# Patient Record
Sex: FEMALE | Race: BLACK OR AFRICAN AMERICAN | NOT HISPANIC OR LATINO | Employment: FULL TIME | URBAN - METROPOLITAN AREA
[De-identification: names, ages, dates, MRNs, and addresses within clinical notes are randomized per-mention and may not be internally consistent; named-entity substitution may affect disease eponyms.]

---

## 2017-06-02 ENCOUNTER — PATIENT MESSAGE (OUTPATIENT)
Dept: FAMILY MEDICINE | Facility: CLINIC | Age: 27
End: 2017-06-02

## 2017-06-21 ENCOUNTER — LAB VISIT (OUTPATIENT)
Dept: LAB | Facility: OTHER | Age: 27
End: 2017-06-21
Attending: NURSE PRACTITIONER
Payer: COMMERCIAL

## 2017-06-21 ENCOUNTER — OFFICE VISIT (OUTPATIENT)
Dept: OBSTETRICS AND GYNECOLOGY | Facility: CLINIC | Age: 27
End: 2017-06-21
Payer: COMMERCIAL

## 2017-06-21 VITALS
BODY MASS INDEX: 26.75 KG/M2 | SYSTOLIC BLOOD PRESSURE: 100 MMHG | DIASTOLIC BLOOD PRESSURE: 70 MMHG | WEIGHT: 151 LBS | HEIGHT: 63 IN

## 2017-06-21 DIAGNOSIS — Z30.011 ENCOUNTER FOR INITIAL PRESCRIPTION OF CONTRACEPTIVE PILLS: Primary | ICD-10-CM

## 2017-06-21 DIAGNOSIS — Z01.419 WELL WOMAN EXAM WITH ROUTINE GYNECOLOGICAL EXAM: ICD-10-CM

## 2017-06-21 DIAGNOSIS — Z11.3 SCREEN FOR STD (SEXUALLY TRANSMITTED DISEASE): ICD-10-CM

## 2017-06-21 DIAGNOSIS — Z01.419 PAP SMEAR, AS PART OF ROUTINE GYNECOLOGICAL EXAMINATION: ICD-10-CM

## 2017-06-21 LAB
B-HCG UR QL: NEGATIVE
CANDIDA RRNA VAG QL PROBE: NEGATIVE
CTP QC/QA: YES
G VAGINALIS RRNA GENITAL QL PROBE: POSITIVE
HAV IGM SERPL QL IA: NEGATIVE
HBV CORE IGM SERPL QL IA: NEGATIVE
HBV SURFACE AG SERPL QL IA: NEGATIVE
HCV AB SERPL QL IA: NEGATIVE
HIV 1+2 AB+HIV1 P24 AG SERPL QL IA: NEGATIVE
RPR SER QL: NORMAL
T VAGINALIS RRNA GENITAL QL PROBE: NEGATIVE

## 2017-06-21 PROCEDURE — 99385 PREV VISIT NEW AGE 18-39: CPT | Mod: S$GLB,,, | Performed by: NURSE PRACTITIONER

## 2017-06-21 PROCEDURE — 36415 COLL VENOUS BLD VENIPUNCTURE: CPT

## 2017-06-21 PROCEDURE — 86703 HIV-1/HIV-2 1 RESULT ANTBDY: CPT

## 2017-06-21 PROCEDURE — 87480 CANDIDA DNA DIR PROBE: CPT

## 2017-06-21 PROCEDURE — 99999 PR PBB SHADOW E&M-EST. PATIENT-LVL II: CPT | Mod: PBBFAC,,, | Performed by: NURSE PRACTITIONER

## 2017-06-21 PROCEDURE — 81025 URINE PREGNANCY TEST: CPT | Mod: QW,S$GLB,, | Performed by: NURSE PRACTITIONER

## 2017-06-21 PROCEDURE — 87591 N.GONORRHOEAE DNA AMP PROB: CPT

## 2017-06-21 PROCEDURE — 86592 SYPHILIS TEST NON-TREP QUAL: CPT

## 2017-06-21 PROCEDURE — 80074 ACUTE HEPATITIS PANEL: CPT

## 2017-06-21 PROCEDURE — 88175 CYTOPATH C/V AUTO FLUID REDO: CPT

## 2017-06-21 RX ORDER — DESOGESTREL AND ETHINYL ESTRADIOL 21-5 (28)
1 KIT ORAL DAILY
Qty: 30 TABLET | Refills: 11 | Status: SHIPPED | OUTPATIENT
Start: 2017-06-21 | End: 2018-06-17 | Stop reason: SDUPTHER

## 2017-06-21 NOTE — PROGRESS NOTES
CC: Annual  HPI: Pt is a 26 y.o.  female who presents for routine annual exam. She uses condoms for contraception. She does want STD screening. She has been on OCPs in the past and is interested in starting them again. Reports some weight gain while on Minastrin in the past but thinks it was d/t stress of school at the time. Pt has received the HPV vaccine in full. No family h/o breast cancer. Regular cycles- LMP 6/12/17. 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 No problems today.     Last pap-none on record (per pt report 2 years ago and WNL)    ROS:  GENERAL: Feeling well overall. Denies fever or chills.   SKIN: Denies rash or lesions.   HEAD: Denies head injury or headache.   NODES: Denies enlarged lymph nodes.   CHEST: Denies chest pain or shortness of breath.   CARDIOVASCULAR: Denies palpitations or left sided chest pain.   ABDOMEN: No abdominal pain, constipation, diarrhea, nausea, vomiting or rectal bleeding.   URINARY: No dysuria, hematuria, or burning on urination.  REPRODUCTIVE: See HPI.   BREASTS: Denies pain, lumps, or nipple discharge.   HEMATOLOGIC: No easy bruisability or excessive bleeding.   MUSCULOSKELETAL: Denies joint pain or swelling.   NEUROLOGIC: Denies syncope or weakness.   PSYCHIATRIC: Denies depression, anxiety or mood swings.    PE:   APPEARANCE: Well nourished, well developed, Black or  female in no acute distress.  NODES: no cervical, supraclavicular, or inguinal lymphadenopathy  BREASTS: Symmetrical, no skin changes or visible lesions. No palpable masses, nipple discharge or adenopathy bilaterally.  ABDOMEN: Soft. No tenderness or masses. No distention. No hernias palpated. No CVA tenderness.  VULVA: No lesions. Normal external female genitalia.  URETHRAL MEATUS: Normal size and location, no lesions, no prolapse.  URETHRA: No masses, tenderness, or prolapse.  VAGINA: Moist. No lesions or lacerations noted. No abnormal discharge present. No odor present.    CERVIX: No lesions or discharge. No cervical motion tenderness.   UTERUS: Normal size, regular shape, mobile, non-tender.  ADNEXA: No tenderness. No fullness or masses palpated in the adnexal regions.   ANUS PERINEUM: Normal.      Diagnosis:  1. Encounter for initial prescription of contraceptive pills    2. Well woman exam with routine gynecological exam    3. Pap smear, as part of routine gynecological examination    4. Screen for STD (sexually transmitted disease)        Plan:     Orders Placed This Encounter    C. trachomatis/N. gonorrhoeae by AMP DNA Cervix    Vaginosis Screen by DNA Probe    HIV-1 and HIV-2 antibodies    RPR    Hepatitis panel, acute    POCT urine pregnancy    Liquid-based pap smear, screening    desog-e.estradiol/e.estradiol (KARIVA) 0.15-0.02 mgx21 /0.01 mg x 5 per tablet     -upt negative  -pap updated  -full std panel  -rx ocps  -The use of the oral contraceptive has been fully discussed with the patient. This includes the proper method to initiate and continue the pills, the need for regular compliance to ensure adequate contraceptive effect, the physiology which make the pill effective, the instructions for what to do in event of a missed pill, and warnings about anticipated minor side effects such as breakthrough spotting, nausea, breast tenderness, weight changes, acne, headaches, etc.  She has been told of the more serious potential side effects such as MI, stroke, and deep vein thrombosis, all of which are very unlikely.  She has been asked to report any signs of such serious problems immediately.  She should back up the pill with a condom during any cycle in which antibiotics are prescribed, and during the first cycle as well. The need for additional protection, such as a condom, to prevent exposure to sexually transmitted diseases has also been discussed- the patient has been clearly reminded that OCP's cannot protect her against diseases such as HIV and others. She  understands and wishes to take the medication as prescribed.     Patient was counseled today on the new ACS guidelines for cervical cytology screening as well as the current recommendations for breast cancer screening. She was counseled to follow up with her PCP for other routine health maintenance. Counseling session lasted approximately 10 minutes, and all her questions were answered.    Follow-up with me in 1 year for routine exam; pap in 3 years.

## 2017-06-22 DIAGNOSIS — B96.89 BV (BACTERIAL VAGINOSIS): Primary | ICD-10-CM

## 2017-06-22 DIAGNOSIS — N76.0 BV (BACTERIAL VAGINOSIS): Primary | ICD-10-CM

## 2017-06-22 LAB
C TRACH DNA SPEC QL NAA+PROBE: NOT DETECTED
N GONORRHOEA DNA SPEC QL NAA+PROBE: NOT DETECTED

## 2017-06-22 RX ORDER — TINIDAZOLE 500 MG/1
2 TABLET ORAL DAILY
Qty: 8 TABLET | Refills: 0 | Status: SHIPPED | OUTPATIENT
Start: 2017-06-22 | End: 2017-06-24

## 2018-06-17 DIAGNOSIS — Z30.011 ENCOUNTER FOR INITIAL PRESCRIPTION OF CONTRACEPTIVE PILLS: ICD-10-CM

## 2018-06-18 ENCOUNTER — PATIENT MESSAGE (OUTPATIENT)
Dept: OBSTETRICS AND GYNECOLOGY | Facility: CLINIC | Age: 28
End: 2018-06-18

## 2018-06-18 RX ORDER — DESOGESTREL AND ETHINYL ESTRADIOL AND ETHINYL ESTRADIOL 21-5 (28)
KIT ORAL
Qty: 30 TABLET | Refills: 0 | Status: SHIPPED | OUTPATIENT
Start: 2018-06-18 | End: 2018-08-22 | Stop reason: SDUPTHER

## 2018-06-19 DIAGNOSIS — Z30.011 ENCOUNTER FOR INITIAL PRESCRIPTION OF CONTRACEPTIVE PILLS: ICD-10-CM

## 2018-06-19 RX ORDER — DESOGESTREL AND ETHINYL ESTRADIOL AND ETHINYL ESTRADIOL 21-5 (28)
KIT ORAL
Qty: 28 TABLET | Refills: 0 | OUTPATIENT
Start: 2018-06-19

## 2018-08-22 ENCOUNTER — OFFICE VISIT (OUTPATIENT)
Dept: OBSTETRICS AND GYNECOLOGY | Facility: CLINIC | Age: 28
End: 2018-08-22
Payer: COMMERCIAL

## 2018-08-22 VITALS
WEIGHT: 156.5 LBS | HEIGHT: 63 IN | SYSTOLIC BLOOD PRESSURE: 128 MMHG | BODY MASS INDEX: 27.73 KG/M2 | DIASTOLIC BLOOD PRESSURE: 70 MMHG

## 2018-08-22 DIAGNOSIS — Z30.41 ENCOUNTER FOR SURVEILLANCE OF CONTRACEPTIVE PILLS: ICD-10-CM

## 2018-08-22 DIAGNOSIS — Z01.419 WELL WOMAN EXAM WITH ROUTINE GYNECOLOGICAL EXAM: Primary | ICD-10-CM

## 2018-08-22 DIAGNOSIS — Z30.011 ENCOUNTER FOR INITIAL PRESCRIPTION OF CONTRACEPTIVE PILLS: ICD-10-CM

## 2018-08-22 PROCEDURE — 99395 PREV VISIT EST AGE 18-39: CPT | Mod: S$GLB,,, | Performed by: NURSE PRACTITIONER

## 2018-08-22 PROCEDURE — 99999 PR PBB SHADOW E&M-EST. PATIENT-LVL III: CPT | Mod: PBBFAC,,, | Performed by: NURSE PRACTITIONER

## 2018-08-22 RX ORDER — DESOGESTREL AND ETHINYL ESTRADIOL 21-5 (28)
1 KIT ORAL DAILY
Qty: 90 TABLET | Refills: 3 | Status: SHIPPED | OUTPATIENT
Start: 2018-08-22 | End: 2019-09-19

## 2018-08-22 NOTE — PROGRESS NOTES
CC: Annual  HPI: Pt is a 27 y.o.  female who presents for routine annual exam. She uses OCPs for contraception. She is happy with them and desires a refill. She does not want STD screening. No problems today. No changes in her medical history. 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     Last pap 6/2017 WNL    ROS:  GENERAL: Feeling well overall. Denies fever or chills.   SKIN: Denies rash or lesions.   HEAD: Denies head injury or headache.   NODES: Denies enlarged lymph nodes.   CHEST: Denies chest pain or shortness of breath.   CARDIOVASCULAR: Denies palpitations or left sided chest pain.   ABDOMEN: No abdominal pain, constipation, diarrhea, nausea, vomiting or rectal bleeding.   URINARY: No dysuria, hematuria, or burning on urination.  REPRODUCTIVE: See HPI.   BREASTS: Denies pain, lumps, or nipple discharge.   HEMATOLOGIC: No easy bruisability or excessive bleeding.   MUSCULOSKELETAL: Denies joint pain or swelling.   NEUROLOGIC: Denies syncope or weakness.   PSYCHIATRIC: Denies depression, anxiety or mood swings.    PE:   APPEARANCE: Well nourished, well developed, Black or  female in no acute distress.  NODES: no cervical, supraclavicular, or inguinal lymphadenopathy  BREASTS: Symmetrical, no skin changes or visible lesions. No palpable masses, nipple discharge or adenopathy bilaterally.  ABDOMEN: Soft. No tenderness or masses. No distention. No hernias palpated. No CVA tenderness.  VULVA: No lesions. Normal external female genitalia.  URETHRAL MEATUS: Normal size and location, no lesions, no prolapse.  URETHRA: No masses, tenderness, or prolapse.  VAGINA: Moist. No lesions or lacerations noted. No abnormal discharge present. No odor present.   CERVIX: No lesions or discharge. No cervical motion tenderness.   UTERUS: Normal size, regular shape, mobile, non-tender.  ADNEXA: No tenderness. No fullness or masses palpated in the adnexal regions.   ANUS PERINEUM:  Normal.      Diagnosis:  1. Well woman exam with routine gynecological exam    2. Encounter for surveillance of contraceptive pills    3. Encounter for initial prescription of contraceptive pills        Plan:   Pap current  OCPs refilled-The use of the oral contraceptive has been fully discussed with the patient. This includes the proper method to initiate and continue the pills, the need for regular compliance to ensure adequate contraceptive effect, the physiology which make the pill effective, the instructions for what to do in event of a missed pill, and warnings about anticipated minor side effects such as breakthrough spotting, nausea, breast tenderness, weight changes, acne, headaches, etc.  She has been told of the more serious potential side effects such as MI, stroke, and deep vein thrombosis, all of which are very unlikely.  She has been asked to report any signs of such serious problems immediately.  She should back up the pill with a condom during any cycle in which antibiotics are prescribed, and during the first cycle as well. The need for additional protection, such as a condom, to prevent exposure to sexually transmitted diseases has also been discussed- the patient has been clearly reminded that OCP's cannot protect her against diseases such as HIV and others. She understands and wishes to take the medication as prescribed.     Patient was counseled today on the new ACS guidelines for cervical cytology screening as well as the current recommendations for breast cancer screening. She was counseled to follow up with her PCP for other routine health maintenance. Counseling session lasted approximately 10 minutes, and all her questions were answered.    Follow-up with me in 1 year for routine exam; pap in 2 years.

## 2018-09-09 ENCOUNTER — PATIENT MESSAGE (OUTPATIENT)
Dept: OBSTETRICS AND GYNECOLOGY | Facility: CLINIC | Age: 28
End: 2018-09-09

## 2019-09-19 ENCOUNTER — OFFICE VISIT (OUTPATIENT)
Dept: OBSTETRICS AND GYNECOLOGY | Facility: CLINIC | Age: 29
End: 2019-09-19
Payer: COMMERCIAL

## 2019-09-19 VITALS
BODY MASS INDEX: 30.86 KG/M2 | WEIGHT: 174.19 LBS | DIASTOLIC BLOOD PRESSURE: 80 MMHG | HEIGHT: 63 IN | SYSTOLIC BLOOD PRESSURE: 114 MMHG

## 2019-09-19 DIAGNOSIS — Z01.419 WELL WOMAN EXAM WITH ROUTINE GYNECOLOGICAL EXAM: Primary | ICD-10-CM

## 2019-09-19 DIAGNOSIS — Z11.3 SCREEN FOR STD (SEXUALLY TRANSMITTED DISEASE): ICD-10-CM

## 2019-09-19 LAB
C TRACH DNA SPEC QL NAA+PROBE: NOT DETECTED
N GONORRHOEA DNA SPEC QL NAA+PROBE: NOT DETECTED

## 2019-09-19 PROCEDURE — 99395 PR PREVENTIVE VISIT,EST,18-39: ICD-10-PCS | Mod: S$GLB,,, | Performed by: NURSE PRACTITIONER

## 2019-09-19 PROCEDURE — 87491 CHLMYD TRACH DNA AMP PROBE: CPT

## 2019-09-19 PROCEDURE — 99999 PR PBB SHADOW E&M-EST. PATIENT-LVL III: CPT | Mod: PBBFAC,,, | Performed by: NURSE PRACTITIONER

## 2019-09-19 PROCEDURE — 99395 PREV VISIT EST AGE 18-39: CPT | Mod: S$GLB,,, | Performed by: NURSE PRACTITIONER

## 2019-09-19 PROCEDURE — 99999 PR PBB SHADOW E&M-EST. PATIENT-LVL III: ICD-10-PCS | Mod: PBBFAC,,, | Performed by: NURSE PRACTITIONER

## 2019-09-24 ENCOUNTER — PATIENT MESSAGE (OUTPATIENT)
Dept: OBSTETRICS AND GYNECOLOGY | Facility: CLINIC | Age: 29
End: 2019-09-24

## 2019-09-25 RX ORDER — DESOGESTREL AND ETHINYL ESTRADIOL 21-5 (28)
1 KIT ORAL DAILY
Qty: 90 TABLET | Refills: 0 | Status: SHIPPED | OUTPATIENT
Start: 2019-09-25 | End: 2019-11-13 | Stop reason: SDUPTHER

## 2019-11-06 ENCOUNTER — PATIENT MESSAGE (OUTPATIENT)
Dept: OBSTETRICS AND GYNECOLOGY | Facility: CLINIC | Age: 29
End: 2019-11-06

## 2019-11-13 RX ORDER — DESOGESTREL AND ETHINYL ESTRADIOL 21-5 (28)
1 KIT ORAL DAILY
Qty: 90 TABLET | Refills: 3 | Status: SHIPPED | OUTPATIENT
Start: 2019-11-13 | End: 2020-04-03

## 2020-02-20 ENCOUNTER — OCCUPATIONAL HEALTH (OUTPATIENT)
Dept: URGENT CARE | Facility: CLINIC | Age: 30
End: 2020-02-20

## 2020-02-20 DIAGNOSIS — Z02.1 PRE-EMPLOYMENT EXAMINATION: Primary | ICD-10-CM

## 2020-02-20 PROCEDURE — 86580 TB INTRADERMAL TEST: CPT | Mod: S$GLB,,, | Performed by: PHYSICIAN ASSISTANT

## 2020-02-20 PROCEDURE — 99499 UNLISTED E&M SERVICE: CPT | Mod: S$GLB,,, | Performed by: PHYSICIAN ASSISTANT

## 2020-02-20 PROCEDURE — 86580 POCT TB SKIN TEST: ICD-10-PCS | Mod: S$GLB,,, | Performed by: PHYSICIAN ASSISTANT

## 2020-02-20 PROCEDURE — 99499 PHYSICAL, BASIC COMPLEXITY: ICD-10-PCS | Mod: S$GLB,,, | Performed by: PHYSICIAN ASSISTANT

## 2020-04-03 RX ORDER — DESOGESTREL AND ETHINYL ESTRADIOL AND ETHINYL ESTRADIOL 21-5 (28)
KIT ORAL
Qty: 84 TABLET | Refills: 0 | Status: SHIPPED | OUTPATIENT
Start: 2020-04-03 | End: 2020-07-01

## 2020-07-15 ENCOUNTER — TELEPHONE (OUTPATIENT)
Dept: OBSTETRICS AND GYNECOLOGY | Facility: CLINIC | Age: 30
End: 2020-07-15

## 2020-07-15 NOTE — TELEPHONE ENCOUNTER
----- Message from Marilyn Madden NP sent at 7/15/2020  2:45 PM CDT -----  Pt needs to be rescheduled- I will not be in clinic at the time of her appt.

## 2020-07-15 NOTE — TELEPHONE ENCOUNTER
Contacted pt to reschedule appt due to provider being out of office, pt did not answer. LVM with call back number.

## 2020-07-29 ENCOUNTER — LAB VISIT (OUTPATIENT)
Dept: LAB | Facility: HOSPITAL | Age: 30
End: 2020-07-29
Attending: NURSE PRACTITIONER
Payer: COMMERCIAL

## 2020-07-29 ENCOUNTER — OFFICE VISIT (OUTPATIENT)
Dept: INTERNAL MEDICINE | Facility: CLINIC | Age: 30
End: 2020-07-29
Payer: COMMERCIAL

## 2020-07-29 VITALS
WEIGHT: 180.13 LBS | BODY MASS INDEX: 30.75 KG/M2 | HEART RATE: 96 BPM | DIASTOLIC BLOOD PRESSURE: 70 MMHG | HEIGHT: 64 IN | OXYGEN SATURATION: 98 % | SYSTOLIC BLOOD PRESSURE: 116 MMHG | TEMPERATURE: 99 F

## 2020-07-29 DIAGNOSIS — Z01.419 PAP TEST, AS PART OF ROUTINE GYNECOLOGICAL EXAMINATION: ICD-10-CM

## 2020-07-29 DIAGNOSIS — Z01.89 ROUTINE LAB DRAW: ICD-10-CM

## 2020-07-29 DIAGNOSIS — Z13.220 SCREENING CHOLESTEROL LEVEL: ICD-10-CM

## 2020-07-29 DIAGNOSIS — Z00.00 ENCOUNTER FOR HEALTH MAINTENANCE EXAMINATION: Primary | ICD-10-CM

## 2020-07-29 DIAGNOSIS — E66.9 OBESITY (BMI 30-39.9): ICD-10-CM

## 2020-07-29 LAB
BASOPHILS # BLD AUTO: 0.09 K/UL (ref 0–0.2)
BASOPHILS NFR BLD: 0.8 % (ref 0–1.9)
BILIRUB UR QL STRIP: NEGATIVE
CLARITY UR REFRACT.AUTO: CLEAR
COLOR UR AUTO: YELLOW
DIFFERENTIAL METHOD: ABNORMAL
EOSINOPHIL # BLD AUTO: 0.3 K/UL (ref 0–0.5)
EOSINOPHIL NFR BLD: 3.1 % (ref 0–8)
ERYTHROCYTE [DISTWIDTH] IN BLOOD BY AUTOMATED COUNT: 13.5 % (ref 11.5–14.5)
GLUCOSE UR QL STRIP: NEGATIVE
HCT VFR BLD AUTO: 40.4 % (ref 37–48.5)
HGB BLD-MCNC: 12.9 G/DL (ref 12–16)
HGB UR QL STRIP: ABNORMAL
IMM GRANULOCYTES # BLD AUTO: 0.03 K/UL (ref 0–0.04)
IMM GRANULOCYTES NFR BLD AUTO: 0.3 % (ref 0–0.5)
KETONES UR QL STRIP: NEGATIVE
LEUKOCYTE ESTERASE UR QL STRIP: NEGATIVE
LYMPHOCYTES # BLD AUTO: 3.4 K/UL (ref 1–4.8)
LYMPHOCYTES NFR BLD: 31.8 % (ref 18–48)
MCH RBC QN AUTO: 27.6 PG (ref 27–31)
MCHC RBC AUTO-ENTMCNC: 31.9 G/DL (ref 32–36)
MCV RBC AUTO: 86 FL (ref 82–98)
MICROSCOPIC COMMENT: NORMAL
MONOCYTES # BLD AUTO: 0.7 K/UL (ref 0.3–1)
MONOCYTES NFR BLD: 6.9 % (ref 4–15)
NEUTROPHILS # BLD AUTO: 6.1 K/UL (ref 1.8–7.7)
NEUTROPHILS NFR BLD: 57.1 % (ref 38–73)
NITRITE UR QL STRIP: NEGATIVE
NRBC BLD-RTO: 0 /100 WBC
PH UR STRIP: 6 [PH] (ref 5–8)
PLATELET # BLD AUTO: 247 K/UL (ref 150–350)
PMV BLD AUTO: 11.3 FL (ref 9.2–12.9)
PROT UR QL STRIP: NEGATIVE
RBC # BLD AUTO: 4.68 M/UL (ref 4–5.4)
RBC #/AREA URNS AUTO: 1 /HPF (ref 0–4)
SP GR UR STRIP: 1.02 (ref 1–1.03)
SQUAMOUS #/AREA URNS AUTO: 3 /HPF
URN SPEC COLLECT METH UR: ABNORMAL
WBC # BLD AUTO: 10.72 K/UL (ref 3.9–12.7)
WBC #/AREA URNS AUTO: 0 /HPF (ref 0–5)

## 2020-07-29 PROCEDURE — 99999 PR PBB SHADOW E&M-EST. PATIENT-LVL IV: CPT | Mod: PBBFAC,,, | Performed by: NURSE PRACTITIONER

## 2020-07-29 PROCEDURE — 80061 LIPID PANEL: CPT

## 2020-07-29 PROCEDURE — 99999 PR PBB SHADOW E&M-EST. PATIENT-LVL IV: ICD-10-PCS | Mod: PBBFAC,,, | Performed by: NURSE PRACTITIONER

## 2020-07-29 PROCEDURE — 81001 URINALYSIS AUTO W/SCOPE: CPT

## 2020-07-29 PROCEDURE — 84443 ASSAY THYROID STIM HORMONE: CPT

## 2020-07-29 PROCEDURE — 99385 PR PREVENTIVE VISIT,NEW,18-39: ICD-10-PCS | Mod: S$GLB,,, | Performed by: NURSE PRACTITIONER

## 2020-07-29 PROCEDURE — 99385 PREV VISIT NEW AGE 18-39: CPT | Mod: S$GLB,,, | Performed by: NURSE PRACTITIONER

## 2020-07-29 PROCEDURE — 82306 VITAMIN D 25 HYDROXY: CPT

## 2020-07-29 PROCEDURE — 36415 COLL VENOUS BLD VENIPUNCTURE: CPT

## 2020-07-29 PROCEDURE — 80053 COMPREHEN METABOLIC PANEL: CPT

## 2020-07-29 PROCEDURE — 85025 COMPLETE CBC W/AUTO DIFF WBC: CPT

## 2020-07-29 NOTE — PROGRESS NOTES
Subjective:       Patient ID: Autumn Clarke is a 29 y.o. female.    Chief Complaint: Annual Exam    Pt new to me, here for check up/lab work.    Previous PCP Dr. Lombard I have reviewed the ROS info pt entered in portal prior to visit today below      Review of Systems   Constitutional: Negative for activity change, appetite change and unexpected weight change.   HENT: Negative for dental problem, hearing loss, rhinorrhea and trouble swallowing.    Eyes: Negative for discharge and visual disturbance.   Respiratory: Negative for apnea, cough, chest tightness, shortness of breath and wheezing.    Cardiovascular: Negative for chest pain, palpitations and leg swelling.   Gastrointestinal: Negative for abdominal distention, abdominal pain, anal bleeding, blood in stool, constipation, diarrhea, nausea, rectal pain and vomiting.   Endocrine: Negative for cold intolerance, heat intolerance, polydipsia, polyphagia and polyuria.   Genitourinary: Negative for difficulty urinating, dysuria, hematuria, menstrual problem, pelvic pain and vaginal pain.   Musculoskeletal: Negative for arthralgias, joint swelling and neck pain.   Integumentary:  Negative for color change.   Allergic/Immunologic: Negative for environmental allergies, food allergies and immunocompromised state.   Neurological: Negative for dizziness, speech difficulty, weakness and headaches.   Hematological: Negative for adenopathy. Does not bruise/bleed easily.   Psychiatric/Behavioral: Negative for agitation, behavioral problems, confusion, dysphoric mood, sleep disturbance and suicidal ideas.     Review of patient's allergies indicates:  No Known Allergies      Current Outpatient Medications:     cetirizine (ZYRTEC) 10 MG tablet, , Disp: , Rfl:     fluticasone propionate (FLONASE ALLERGY RELIEF) 50 mcg/actuation nasal spray, , Disp: , Rfl:     There is no problem list on file for this patient.    History reviewed. No pertinent past medical  "history.    History reviewed. No pertinent surgical history.    Social History     Socioeconomic History    Marital status: Single     Spouse name: Not on file    Number of children: Not on file    Years of education: Not on file    Highest education level: Not on file   Occupational History    Not on file   Social Needs    Financial resource strain: Not hard at all    Food insecurity     Worry: Never true     Inability: Never true    Transportation needs     Medical: No     Non-medical: No   Tobacco Use    Smoking status: Never Smoker    Smokeless tobacco: Never Used   Substance and Sexual Activity    Alcohol use: Yes     Frequency: 2-3 times a week     Drinks per session: 1 or 2     Binge frequency: Never     Comment: sometimes    Drug use: No    Sexual activity: Yes     Partners: Male     Birth control/protection: Condom   Lifestyle    Physical activity     Days per week: 2 days     Minutes per session: 20 min    Stress: Only a little   Relationships    Social connections     Talks on phone: More than three times a week     Gets together: Twice a week     Attends Sikhism service: Not on file     Active member of club or organization: No     Attends meetings of clubs or organizations: Never     Relationship status: Never    Other Topics Concern    Not on file   Social History Narrative    Not on file     Family History   Problem Relation Age of Onset    Hypertension Mother     No Known Problems Father     Breast cancer Neg Hx     Colon cancer Neg Hx     Ovarian cancer Neg Hx          Objective:       Vitals:    07/29/20 1533   BP: 116/70   Pulse: 96   Temp: 99.1 °F (37.3 °C)   SpO2: 98%   Weight: 81.7 kg (180 lb 1.9 oz)   Height: 5' 4" (1.626 m)   PainSc: 0-No pain     Patient's last menstrual period was 07/26/2020.    Body mass index is 30.92 kg/m².    Physical Exam  Vitals signs and nursing note reviewed.   Constitutional:       Appearance: Normal appearance. She is " well-developed. She is obese.   HENT:      Head: Normocephalic.      Right Ear: Hearing, tympanic membrane, ear canal and external ear normal.      Left Ear: Hearing, tympanic membrane, ear canal and external ear normal.      Nose: Nose normal.      Mouth/Throat:      Mouth: Mucous membranes are moist.      Pharynx: Oropharynx is clear.   Eyes:      General: Lids are normal. Lids are everted, no foreign bodies appreciated.      Conjunctiva/sclera: Conjunctivae normal.      Pupils: Pupils are equal, round, and reactive to light.   Neck:      Musculoskeletal: Full passive range of motion without pain, normal range of motion and neck supple.      Vascular: No carotid bruit or JVD.      Trachea: Trachea normal.   Cardiovascular:      Rate and Rhythm: Normal rate and regular rhythm.      Pulses: Normal pulses.      Heart sounds: Normal heart sounds, S1 normal and S2 normal.   Pulmonary:      Effort: Pulmonary effort is normal.      Breath sounds: Normal breath sounds.   Abdominal:      General: Abdomen is flat. Bowel sounds are normal.      Palpations: Abdomen is soft.      Comments: obese   Musculoskeletal: Normal range of motion.   Skin:     General: Skin is warm and dry.      Capillary Refill: Capillary refill takes less than 2 seconds.   Neurological:      General: No focal deficit present.      Mental Status: She is alert and oriented to person, place, and time.      Deep Tendon Reflexes: Reflexes are normal and symmetric.   Psychiatric:         Mood and Affect: Mood normal.         Speech: Speech normal.         Behavior: Behavior normal.         Thought Content: Thought content normal.         Judgment: Judgment normal.         Assessment:       1. Encounter for health maintenance examination    2. Routine lab draw    3. Screening cholesterol level    4. Pap test, as part of routine gynecological examination    5. BMI 30.0-30.9,adult    6. Obesity (BMI 30-39.9)        Plan:       Autumn was seen today for annual  exam.    Diagnoses and all orders for this visit:    Encounter for health maintenance examination  Annual wellness exam completed.    All medications, histories, and concerns reviewed, reconciled, and addressed.    Appropriate Screenings per pt's sex and age have been reviewed and discussed with pt.    BMI reviewed.    Routine lab draw  -     CBC auto differential; Future  -     Comprehensive metabolic panel; Future  -     Lipid Panel; Future  -     TSH; Future  -     Urinalysis  -     Vitamin D; Future    Screening cholesterol level  -     Lipid Panel; Future    Pap test, as part of routine gynecological examination  Pt has appt scheduled with GYN    BMI 30.0-30.9,adult  BMI reviewed    Obesity (BMI 30-39.9)  BMI reviewed.    Diet and exercise to lose weight.    Fasting labs ordered and drawn today, will call with results, if results ok, RTC in 1 yr for annual or sooner prn with one of MDs I work with who can be your new PCP: Dr. Genevieve Luciano, Dr. Can Robles, Dr. Cat Jean Baptiste, Dr. Yonis Lazaro, Dr. Yohan Mays    Follow up in about 1 year (around 7/29/2021) for annual or sooner as needed with one of MDs recommended on AVS.

## 2020-07-29 NOTE — PATIENT INSTRUCTIONS
Fasting labs ordered and drawn today, will call with results, if results ok, RTC in 1 yr for annual or sooner prn with one of MDs I work with who can be your new PCP: Dr. Genevieve Luciano, Dr. Can Robles, Dr. Cat Jean Baptiste, Dr. Yonis Lazaro, Dr. Yohan Mays

## 2020-07-30 LAB
25(OH)D3+25(OH)D2 SERPL-MCNC: 20 NG/ML (ref 30–96)
ALBUMIN SERPL BCP-MCNC: 3.3 G/DL (ref 3.5–5.2)
ALP SERPL-CCNC: 65 U/L (ref 55–135)
ALT SERPL W/O P-5'-P-CCNC: 15 U/L (ref 10–44)
ANION GAP SERPL CALC-SCNC: 6 MMOL/L (ref 8–16)
AST SERPL-CCNC: 17 U/L (ref 10–40)
BILIRUB SERPL-MCNC: 0.2 MG/DL (ref 0.1–1)
BUN SERPL-MCNC: 10 MG/DL (ref 6–20)
CALCIUM SERPL-MCNC: 8.8 MG/DL (ref 8.7–10.5)
CHLORIDE SERPL-SCNC: 109 MMOL/L (ref 95–110)
CHOLEST SERPL-MCNC: 170 MG/DL (ref 120–199)
CHOLEST/HDLC SERPL: 3.1 {RATIO} (ref 2–5)
CO2 SERPL-SCNC: 25 MMOL/L (ref 23–29)
CREAT SERPL-MCNC: 0.8 MG/DL (ref 0.5–1.4)
EST. GFR  (AFRICAN AMERICAN): >60 ML/MIN/1.73 M^2
EST. GFR  (NON AFRICAN AMERICAN): >60 ML/MIN/1.73 M^2
GLUCOSE SERPL-MCNC: 92 MG/DL (ref 70–110)
HDLC SERPL-MCNC: 54 MG/DL (ref 40–75)
HDLC SERPL: 31.8 % (ref 20–50)
LDLC SERPL CALC-MCNC: 95.8 MG/DL (ref 63–159)
NONHDLC SERPL-MCNC: 116 MG/DL
POTASSIUM SERPL-SCNC: 4.3 MMOL/L (ref 3.5–5.1)
PROT SERPL-MCNC: 7 G/DL (ref 6–8.4)
SODIUM SERPL-SCNC: 140 MMOL/L (ref 136–145)
TRIGL SERPL-MCNC: 101 MG/DL (ref 30–150)
TSH SERPL DL<=0.005 MIU/L-ACNC: 0.77 UIU/ML (ref 0.4–4)

## 2020-07-30 NOTE — PROGRESS NOTES
Overall your labs are stable except slightly low vitamin D. Recommended to take otc Vitamin D3 2000 iu daily.    Repeat labs in 1 yr with annual.    Sheri Johnson DNP, FNP-C

## 2020-09-30 ENCOUNTER — OFFICE VISIT (OUTPATIENT)
Dept: OBSTETRICS AND GYNECOLOGY | Facility: CLINIC | Age: 30
End: 2020-09-30
Payer: COMMERCIAL

## 2020-09-30 VITALS
DIASTOLIC BLOOD PRESSURE: 72 MMHG | HEIGHT: 63 IN | BODY MASS INDEX: 31.92 KG/M2 | WEIGHT: 180.13 LBS | SYSTOLIC BLOOD PRESSURE: 110 MMHG

## 2020-09-30 DIAGNOSIS — Z11.3 ROUTINE SCREENING FOR STI (SEXUALLY TRANSMITTED INFECTION): ICD-10-CM

## 2020-09-30 DIAGNOSIS — Z01.419 WELL WOMAN EXAM WITH ROUTINE GYNECOLOGICAL EXAM: ICD-10-CM

## 2020-09-30 DIAGNOSIS — Z12.4 PAP SMEAR FOR CERVICAL CANCER SCREENING: Primary | ICD-10-CM

## 2020-09-30 PROCEDURE — 99999 PR PBB SHADOW E&M-EST. PATIENT-LVL III: CPT | Mod: PBBFAC,,, | Performed by: NURSE PRACTITIONER

## 2020-09-30 PROCEDURE — 87510 GARDNER VAG DNA DIR PROBE: CPT

## 2020-09-30 PROCEDURE — 87491 CHLMYD TRACH DNA AMP PROBE: CPT

## 2020-09-30 PROCEDURE — 99395 PREV VISIT EST AGE 18-39: CPT | Mod: S$GLB,,, | Performed by: NURSE PRACTITIONER

## 2020-09-30 PROCEDURE — 99999 PR PBB SHADOW E&M-EST. PATIENT-LVL III: ICD-10-PCS | Mod: PBBFAC,,, | Performed by: NURSE PRACTITIONER

## 2020-09-30 PROCEDURE — 99395 PR PREVENTIVE VISIT,EST,18-39: ICD-10-PCS | Mod: S$GLB,,, | Performed by: NURSE PRACTITIONER

## 2020-09-30 PROCEDURE — 88175 CYTOPATH C/V AUTO FLUID REDO: CPT

## 2020-09-30 PROCEDURE — 87480 CANDIDA DNA DIR PROBE: CPT

## 2020-09-30 NOTE — PROGRESS NOTES
CC: Annual  HPI: Pt is a 29 y.o.  female who presents for routine annual exam. She uses condoms for contraception-SA with one parnter. She does want STD screening-swabs only. No issues or complaints today. Cycles are regular. Will get flu shot at work.     FH:   Breast cancer: none  Colon cancer: none  Ovarian cancer: none  Uterine cancer: none    Flu vaccine: will get at work  HPV vaccine: yes  Last pap smear: 2017  Colonoscopy: na  DEXA: na  Mammogram: na  STD history: no  Birth control: condoms  OB history: G0  Tobacco use: no    ROS:  GENERAL: Feeling well overall. Denies fever or chills.   SKIN: Denies rash or lesions.   HEAD: Denies head injury or headache.   NODES: Denies enlarged lymph nodes.   CHEST: Denies chest pain or shortness of breath.   CARDIOVASCULAR: Denies palpitations or left sided chest pain.   ABDOMEN: No abdominal pain, constipation, diarrhea, nausea, vomiting or rectal bleeding.   URINARY: No dysuria, hematuria, or burning on urination.  REPRODUCTIVE: See HPI.   BREASTS: Denies pain, lumps, or nipple discharge.   HEMATOLOGIC: No easy bruisability or excessive bleeding.   MUSCULOSKELETAL: Denies joint pain or swelling.   NEUROLOGIC: Denies syncope or weakness.   PSYCHIATRIC: Denies depression, anxiety or mood swings.    PE:   APPEARANCE: Well nourished, well developed, Black or  female in no acute distress.  NODES: no cervical, supraclavicular, or inguinal lymphadenopathy  BREASTS: Symmetrical, no skin changes or visible lesions. No palpable masses, nipple discharge or adenopathy bilaterally.  ABDOMEN: Soft. No tenderness or masses. No distention. No hernias palpated. No CVA tenderness.  VULVA: No lesions. Normal external female genitalia.  URETHRAL MEATUS: Normal size and location, no lesions, no prolapse.  URETHRA: No masses, tenderness, or prolapse.  VAGINA: Moist. No lesions or lacerations noted. No abnormal discharge present. No odor present.   CERVIX: No lesions or  discharge. No cervical motion tenderness.   UTERUS: Normal size, regular shape, mobile, non-tender.  ADNEXA: No tenderness. No fullness or masses palpated in the adnexal regions.   ANUS PERINEUM: Normal.      Diagnosis:  1. Pap smear for cervical cancer screening    2. Well woman exam with routine gynecological exam    3. Routine screening for STI (sexually transmitted infection)        Plan:     Orders Placed This Encounter    Vaginosis Screen by DNA Probe    C. trachomatis/N. gonorrhoeae by AMP DNA    Liquid-Based Pap Smear, Screening       1. Pap updated  2. GC and affirm pending    Patient was counseled today on the new ACS guidelines for cervical cytology screening as well as the current recommendations for breast cancer screening. She was counseled to follow up with her PCP for other routine health maintenance. Counseling session lasted approximately 10 minutes, and all her questions were answered.    Follow-up with me in 1 year for routine exam; pap in 3 years.

## 2020-10-01 LAB
CANDIDA RRNA VAG QL PROBE: NEGATIVE
G VAGINALIS RRNA GENITAL QL PROBE: POSITIVE
T VAGINALIS RRNA GENITAL QL PROBE: NEGATIVE

## 2020-10-01 RX ORDER — METRONIDAZOLE 500 MG/1
500 TABLET ORAL EVERY 12 HOURS
Qty: 14 TABLET | Refills: 0 | Status: SHIPPED | OUTPATIENT
Start: 2020-10-01 | End: 2020-10-08

## 2020-10-01 RX ORDER — FLUCONAZOLE 200 MG/1
200 TABLET ORAL
Qty: 2 TABLET | Refills: 0 | Status: SHIPPED | OUTPATIENT
Start: 2020-10-01 | End: 2021-10-05

## 2020-10-22 LAB
FINAL PATHOLOGIC DIAGNOSIS: NORMAL
Lab: NORMAL

## 2020-12-17 ENCOUNTER — PATIENT MESSAGE (OUTPATIENT)
Dept: FAMILY MEDICINE | Facility: CLINIC | Age: 30
End: 2020-12-17

## 2020-12-18 ENCOUNTER — CLINICAL SUPPORT (OUTPATIENT)
Dept: URGENT CARE | Facility: CLINIC | Age: 30
End: 2020-12-18
Payer: COMMERCIAL

## 2020-12-18 DIAGNOSIS — Z11.59 ENCOUNTER FOR SCREENING FOR OTHER VIRAL DISEASES: Primary | ICD-10-CM

## 2020-12-18 LAB
CTP QC/QA: YES
SARS-COV-2 RDRP RESP QL NAA+PROBE: NEGATIVE

## 2020-12-18 PROCEDURE — U0002: ICD-10-PCS | Mod: QW,S$GLB,, | Performed by: FAMILY MEDICINE

## 2020-12-18 PROCEDURE — U0002 COVID-19 LAB TEST NON-CDC: HCPCS | Mod: QW,S$GLB,, | Performed by: FAMILY MEDICINE

## 2021-02-11 ENCOUNTER — OFFICE VISIT (OUTPATIENT)
Dept: URGENT CARE | Facility: CLINIC | Age: 31
End: 2021-02-11
Payer: COMMERCIAL

## 2021-02-11 VITALS
BODY MASS INDEX: 29.41 KG/M2 | OXYGEN SATURATION: 99 % | SYSTOLIC BLOOD PRESSURE: 120 MMHG | TEMPERATURE: 98 F | WEIGHT: 166 LBS | RESPIRATION RATE: 14 BRPM | HEART RATE: 86 BPM | DIASTOLIC BLOOD PRESSURE: 88 MMHG | HEIGHT: 63 IN

## 2021-02-11 DIAGNOSIS — H57.89 EYE REDNESS: Primary | ICD-10-CM

## 2021-02-11 DIAGNOSIS — H10.32 ACUTE CONJUNCTIVITIS OF LEFT EYE, UNSPECIFIED ACUTE CONJUNCTIVITIS TYPE: ICD-10-CM

## 2021-02-11 PROCEDURE — 99214 PR OFFICE/OUTPT VISIT, EST, LEVL IV, 30-39 MIN: ICD-10-PCS | Mod: S$GLB,,, | Performed by: INTERNAL MEDICINE

## 2021-02-11 PROCEDURE — 3008F PR BODY MASS INDEX (BMI) DOCUMENTED: ICD-10-PCS | Mod: CPTII,S$GLB,, | Performed by: INTERNAL MEDICINE

## 2021-02-11 PROCEDURE — 99214 OFFICE O/P EST MOD 30 MIN: CPT | Mod: S$GLB,,, | Performed by: INTERNAL MEDICINE

## 2021-02-11 PROCEDURE — 3008F BODY MASS INDEX DOCD: CPT | Mod: CPTII,S$GLB,, | Performed by: INTERNAL MEDICINE

## 2021-02-11 RX ORDER — POLYMYXIN B SULFATE AND TRIMETHOPRIM 1; 10000 MG/ML; [USP'U]/ML
1 SOLUTION OPHTHALMIC EVERY 4 HOURS
Qty: 1 BOTTLE | Refills: 0 | Status: SHIPPED | OUTPATIENT
Start: 2021-02-11

## 2021-04-12 ENCOUNTER — PATIENT MESSAGE (OUTPATIENT)
Dept: RESEARCH | Facility: HOSPITAL | Age: 31
End: 2021-04-12

## 2021-10-01 ENCOUNTER — IMMUNIZATION (OUTPATIENT)
Dept: INTERNAL MEDICINE | Facility: CLINIC | Age: 31
End: 2021-10-01
Payer: COMMERCIAL

## 2021-10-01 DIAGNOSIS — Z23 NEED FOR VACCINATION: Primary | ICD-10-CM

## 2021-10-01 PROCEDURE — 91300 COVID-19, MRNA, LNP-S, PF, 30 MCG/0.3 ML DOSE VACCINE: CPT | Mod: PBBFAC | Performed by: INTERNAL MEDICINE

## 2021-10-01 PROCEDURE — 0003A COVID-19, MRNA, LNP-S, PF, 30 MCG/0.3 ML DOSE VACCINE: CPT | Mod: CV19,PBBFAC | Performed by: INTERNAL MEDICINE

## 2021-10-05 ENCOUNTER — OFFICE VISIT (OUTPATIENT)
Dept: OBSTETRICS AND GYNECOLOGY | Facility: CLINIC | Age: 31
End: 2021-10-05
Payer: COMMERCIAL

## 2021-10-05 VITALS
DIASTOLIC BLOOD PRESSURE: 77 MMHG | WEIGHT: 160.25 LBS | SYSTOLIC BLOOD PRESSURE: 124 MMHG | HEIGHT: 63 IN | BODY MASS INDEX: 28.39 KG/M2

## 2021-10-05 DIAGNOSIS — Z01.419 WELL WOMAN EXAM WITH ROUTINE GYNECOLOGICAL EXAM: Primary | ICD-10-CM

## 2021-10-05 PROCEDURE — 99999 PR PBB SHADOW E&M-EST. PATIENT-LVL II: CPT | Mod: PBBFAC,,, | Performed by: NURSE PRACTITIONER

## 2021-10-05 PROCEDURE — 99395 PREV VISIT EST AGE 18-39: CPT | Mod: S$GLB,,, | Performed by: NURSE PRACTITIONER

## 2021-10-05 PROCEDURE — 3074F PR MOST RECENT SYSTOLIC BLOOD PRESSURE < 130 MM HG: ICD-10-PCS | Mod: CPTII,S$GLB,, | Performed by: NURSE PRACTITIONER

## 2021-10-05 PROCEDURE — 3078F PR MOST RECENT DIASTOLIC BLOOD PRESSURE < 80 MM HG: ICD-10-PCS | Mod: CPTII,S$GLB,, | Performed by: NURSE PRACTITIONER

## 2021-10-05 PROCEDURE — 1159F MED LIST DOCD IN RCRD: CPT | Mod: CPTII,S$GLB,, | Performed by: NURSE PRACTITIONER

## 2021-10-05 PROCEDURE — 1159F PR MEDICATION LIST DOCUMENTED IN MEDICAL RECORD: ICD-10-PCS | Mod: CPTII,S$GLB,, | Performed by: NURSE PRACTITIONER

## 2021-10-05 PROCEDURE — 99999 PR PBB SHADOW E&M-EST. PATIENT-LVL II: ICD-10-PCS | Mod: PBBFAC,,, | Performed by: NURSE PRACTITIONER

## 2021-10-05 PROCEDURE — 3008F PR BODY MASS INDEX (BMI) DOCUMENTED: ICD-10-PCS | Mod: CPTII,S$GLB,, | Performed by: NURSE PRACTITIONER

## 2021-10-05 PROCEDURE — 99395 PR PREVENTIVE VISIT,EST,18-39: ICD-10-PCS | Mod: S$GLB,,, | Performed by: NURSE PRACTITIONER

## 2021-10-05 PROCEDURE — 3074F SYST BP LT 130 MM HG: CPT | Mod: CPTII,S$GLB,, | Performed by: NURSE PRACTITIONER

## 2021-10-05 PROCEDURE — 3078F DIAST BP <80 MM HG: CPT | Mod: CPTII,S$GLB,, | Performed by: NURSE PRACTITIONER

## 2021-10-05 PROCEDURE — 1160F PR REVIEW ALL MEDS BY PRESCRIBER/CLIN PHARMACIST DOCUMENTED: ICD-10-PCS | Mod: CPTII,S$GLB,, | Performed by: NURSE PRACTITIONER

## 2021-10-05 PROCEDURE — 3008F BODY MASS INDEX DOCD: CPT | Mod: CPTII,S$GLB,, | Performed by: NURSE PRACTITIONER

## 2021-10-05 PROCEDURE — 1160F RVW MEDS BY RX/DR IN RCRD: CPT | Mod: CPTII,S$GLB,, | Performed by: NURSE PRACTITIONER

## 2021-11-30 ENCOUNTER — HOSPITAL ENCOUNTER (EMERGENCY)
Facility: HOSPITAL | Age: 31
Discharge: HOME | End: 2021-11-30
Attending: EMERGENCY MEDICINE
Payer: COMMERCIAL

## 2021-11-30 VITALS
HEIGHT: 63 IN | RESPIRATION RATE: 18 BRPM | WEIGHT: 160 LBS | SYSTOLIC BLOOD PRESSURE: 115 MMHG | HEART RATE: 92 BPM | TEMPERATURE: 99 F | DIASTOLIC BLOOD PRESSURE: 72 MMHG | BODY MASS INDEX: 28.35 KG/M2 | OXYGEN SATURATION: 96 %

## 2021-11-30 DIAGNOSIS — R04.0 EPISTAXIS: Primary | ICD-10-CM

## 2021-11-30 PROCEDURE — 99284 EMERGENCY DEPT VISIT MOD MDM: CPT

## 2021-11-30 PROCEDURE — 25000003 PHARM REV CODE 250: Performed by: EMERGENCY MEDICINE

## 2021-11-30 RX ORDER — OXYMETAZOLINE HCL 0.05 %
1 SPRAY, NON-AEROSOL (ML) NASAL 2 TIMES DAILY PRN
Qty: 15 ML | Refills: 0 | Status: SHIPPED | OUTPATIENT
Start: 2021-11-30 | End: 2021-12-03

## 2021-11-30 RX ORDER — FLUTICASONE PROPIONATE 50 MCG
1 SPRAY, SUSPENSION (ML) NASAL DAILY
Qty: 16 G | Refills: 0 | Status: SHIPPED | OUTPATIENT
Start: 2021-11-30

## 2021-11-30 RX ORDER — OXYMETAZOLINE HCL 0.05 %
1 SPRAY, NON-AEROSOL (ML) NASAL
Status: COMPLETED | OUTPATIENT
Start: 2021-11-30 | End: 2021-11-30

## 2021-11-30 RX ADMIN — NASAL DECONGESTANT 1 SPRAY: 0.05 SPRAY NASAL at 02:11

## 2021-12-26 ENCOUNTER — PATIENT MESSAGE (OUTPATIENT)
Dept: OBSTETRICS AND GYNECOLOGY | Facility: CLINIC | Age: 31
End: 2021-12-26
Payer: COMMERCIAL

## 2023-05-08 ENCOUNTER — OFFICE VISIT (OUTPATIENT)
Dept: DERMATOLOGY | Facility: CLINIC | Age: 33
End: 2023-05-08
Payer: COMMERCIAL

## 2023-05-08 DIAGNOSIS — Z76.89 ENCOUNTER FOR SKIN CARE: Primary | ICD-10-CM

## 2023-05-08 DIAGNOSIS — B07.9 VIRAL WARTS, UNSPECIFIED TYPE: ICD-10-CM

## 2023-05-08 PROCEDURE — 99204 OFFICE O/P NEW MOD 45 MIN: CPT | Mod: S$GLB,,, | Performed by: DERMATOLOGY

## 2023-05-08 PROCEDURE — 99999 PR PBB SHADOW E&M-EST. PATIENT-LVL III: CPT | Mod: PBBFAC,,, | Performed by: DERMATOLOGY

## 2023-05-08 PROCEDURE — 99204 PR OFFICE/OUTPT VISIT, NEW, LEVL IV, 45-59 MIN: ICD-10-PCS | Mod: S$GLB,,, | Performed by: DERMATOLOGY

## 2023-05-08 PROCEDURE — 99999 PR PBB SHADOW E&M-EST. PATIENT-LVL III: ICD-10-PCS | Mod: PBBFAC,,, | Performed by: DERMATOLOGY

## 2023-05-08 PROCEDURE — 1159F MED LIST DOCD IN RCRD: CPT | Mod: CPTII,S$GLB,, | Performed by: DERMATOLOGY

## 2023-05-08 PROCEDURE — 1159F PR MEDICATION LIST DOCUMENTED IN MEDICAL RECORD: ICD-10-PCS | Mod: CPTII,S$GLB,, | Performed by: DERMATOLOGY

## 2023-05-08 NOTE — PROGRESS NOTES
Subjective:      Patient ID:  Autumn Clarke is a 32 y.o. female who presents for   Chief Complaint   Patient presents with    Skin Tags     Under nose, X5yrs, no tx      Skin Tags - Initial  Affected locations: face  Duration: 5 years  Severity: mild to moderate  Timing: constant    Review of Systems   Constitutional: Negative.    HENT: Negative.     Respiratory: Negative.     Musculoskeletal: Negative.      Objective:   Physical Exam   Constitutional: She appears well-developed and well-nourished.   Neurological: She is alert and oriented to person, place, and time.   Psychiatric: She has a normal mood and affect.   Skin:             Diagram Legend     Erythematous scaling macule/papule c/w actinic keratosis       Vascular papule c/w angioma      Pigmented verrucoid papule/plaque c/w seborrheic keratosis      Yellow umbilicated papule c/w sebaceous hyperplasia      Irregularly shaped tan macule c/w lentigo     1-2 mm smooth white papules consistent with Milia      Movable subcutaneous cyst with punctum c/w epidermal inclusion cyst      Subcutaneous movable cyst c/w pilar cyst      Firm pink to brown papule c/w dermatofibroma      Pedunculated fleshy papule(s) c/w skin tag(s)      Evenly pigmented macule c/w junctional nevus     Mildly variegated pigmented, slightly irregular-bordered macule c/w mildly atypical nevus      Flesh colored to evenly pigmented papule c/w intradermal nevus       Pink pearly papule/plaque c/w basal cell carcinoma      Erythematous hyperkeratotic cursted plaque c/w SCC      Surgical scar with no sign of skin cancer recurrence      Open and closed comedones      Inflammatory papules and pustules      Verrucoid papule consistent consistent with wart     Erythematous eczematous patches and plaques     Dystrophic onycholytic nail with subungual debris c/w onychomycosis     Umbilicated papule    Erythematous-base heme-crusted tan verrucoid plaque consistent with inflamed seborrheic  keratosis     Erythematous Silvery Scaling Plaque c/w Psoriasis     See annotation            Assessment / Plan:        Encounter for skin care  No hot water bathing reviewed.    Viral warts, unspecified type  -     salicylic acid 17 % gel; Apply topically once daily. Until scabbing occurs and then let warts fall off.  Repeat as needed.  Dispense: 7.5 g; Refill: 1  Discussed with patient the etiology and pathogenesis of the disease or skin lesion(s) and possible treatments and aggravators.    Reviewed with patient different treatment options and associated risks.  Proper application of medications and or care for affected area(s) and condition(s) reviewed.  Long term scar discussed.  Apply focally to skin tag/warts, not normal skin, and cover with tape or Band Aid until scab forms.  Allow for healing and watch for parts of tag/wart to fall off.  For warts okay to peel away dead skin painlessly.  Repeat as needed until tag/wart completely falls off.  Okay to not cover due to location.  Use cryo as last resort due to poss hypopig in cosmetic sens area.  Patient to watch for recurrence or flares or worsening and to call the clinic for a follow up appointment for such.  Previous [facility] labs and or records and notes reviewed and considered for their impact on our clinical decision making today.             Follow up if symptoms worsen or fail to improve.

## 2023-09-03 NOTE — PROGRESS NOTES
CC: Annual  HPI: Pt is a 28 y.o.  female who presents for routine annual exam. She uses condoms for contraception. Decided to stop OCPs last year. She does want STD screening, GCCT only. No problems today. Working at [employer].     Last pap in 2017 negative    ROS:  GENERAL: Feeling well overall. Denies fever or chills.   SKIN: Denies rash or lesions.   HEAD: Denies head injury or headache.   NODES: Denies enlarged lymph nodes.   CHEST: Denies chest pain or shortness of breath.   CARDIOVASCULAR: Denies palpitations or left sided chest pain.   ABDOMEN: No abdominal pain, constipation, diarrhea, nausea, vomiting or rectal bleeding.   URINARY: No dysuria, hematuria, or burning on urination.  REPRODUCTIVE: See HPI.   BREASTS: Denies pain, lumps, or nipple discharge.   HEMATOLOGIC: No easy bruisability or excessive bleeding.   MUSCULOSKELETAL: Denies joint pain or swelling.   NEUROLOGIC: Denies syncope or weakness.   PSYCHIATRIC: Denies depression, anxiety or mood swings.    PE:   APPEARANCE: Well nourished, well developed, Black or  female in no acute distress.  NODES: no cervical, supraclavicular, or inguinal lymphadenopathy  BREASTS: Symmetrical, no skin changes or visible lesions. No palpable masses, nipple discharge or adenopathy bilaterally.  ABDOMEN: Soft. No tenderness or masses. No distention. No hernias palpated. No CVA tenderness.  VULVA: No lesions. Normal external female genitalia.  URETHRAL MEATUS: Normal size and location, no lesions, no prolapse.  URETHRA: No masses, tenderness, or prolapse.  VAGINA: Moist. No lesions or lacerations noted. No abnormal discharge present. No odor present.   CERVIX: No lesions or discharge. No cervical motion tenderness.   UTERUS: Normal size, regular shape, mobile, non-tender.  ADNEXA: No tenderness. No fullness or masses palpated in the adnexal regions.   ANUS PERINEUM: Normal.      Diagnosis:  1. Well woman exam with routine gynecological exam  Problem: Chronic Conditions and Co-morbidities  Goal: Patient's chronic conditions and co-morbidity symptoms are monitored and maintained or improved  Outcome: Progressing    2. Screen for STD (sexually transmitted disease)        Plan:     Orders Placed This Encounter    C. trachomatis/N. gonorrhoeae by AMP DNA     1. Pap UTD  2. GCCT    Patient was counseled today on the new ACS guidelines for cervical cytology screening as well as the current recommendations for breast cancer screening. She was counseled to follow up with her PCP for other routine health maintenance. Counseling session lasted approximately 10 minutes, and all her questions were answered.    Follow-up with me in 1 year for routine exam; pap in 1 year.

## 2023-11-30 ENCOUNTER — IMMUNIZATION (OUTPATIENT)
Dept: INTERNAL MEDICINE | Facility: CLINIC | Age: 33
End: 2023-11-30
Payer: COMMERCIAL

## 2023-11-30 DIAGNOSIS — Z23 NEED FOR VACCINATION: Primary | ICD-10-CM

## 2023-11-30 PROCEDURE — G0008 ADMIN INFLUENZA VIRUS VAC: HCPCS | Mod: S$GLB,,, | Performed by: INTERNAL MEDICINE

## 2023-11-30 PROCEDURE — 90686 IIV4 VACC NO PRSV 0.5 ML IM: CPT | Mod: S$GLB,,, | Performed by: INTERNAL MEDICINE

## 2023-11-30 PROCEDURE — G0008 FLU VACCINE (QUAD) GREATER THAN OR EQUAL TO 3YO PRESERVATIVE FREE IM: ICD-10-PCS | Mod: S$GLB,,, | Performed by: INTERNAL MEDICINE

## 2023-11-30 PROCEDURE — 90686 FLU VACCINE (QUAD) GREATER THAN OR EQUAL TO 3YO PRESERVATIVE FREE IM: ICD-10-PCS | Mod: S$GLB,,, | Performed by: INTERNAL MEDICINE

## 2024-06-26 ENCOUNTER — OFFICE VISIT (OUTPATIENT)
Dept: OBSTETRICS AND GYNECOLOGY | Facility: CLINIC | Age: 34
End: 2024-06-26
Payer: COMMERCIAL

## 2024-06-26 VITALS — WEIGHT: 155 LBS | SYSTOLIC BLOOD PRESSURE: 120 MMHG | DIASTOLIC BLOOD PRESSURE: 80 MMHG | BODY MASS INDEX: 27.45 KG/M2

## 2024-06-26 DIAGNOSIS — Z12.4 PAP SMEAR FOR CERVICAL CANCER SCREENING: ICD-10-CM

## 2024-06-26 DIAGNOSIS — Z30.011 ENCOUNTER FOR INITIAL PRESCRIPTION OF CONTRACEPTIVE PILLS: ICD-10-CM

## 2024-06-26 DIAGNOSIS — Z01.419 WELL WOMAN EXAM WITH ROUTINE GYNECOLOGICAL EXAM: Primary | ICD-10-CM

## 2024-06-26 PROCEDURE — 99395 PREV VISIT EST AGE 18-39: CPT | Mod: S$GLB,,, | Performed by: NURSE PRACTITIONER

## 2024-06-26 PROCEDURE — 3079F DIAST BP 80-89 MM HG: CPT | Mod: CPTII,S$GLB,, | Performed by: NURSE PRACTITIONER

## 2024-06-26 PROCEDURE — 99999 PR PBB SHADOW E&M-EST. PATIENT-LVL II: CPT | Mod: PBBFAC,,, | Performed by: NURSE PRACTITIONER

## 2024-06-26 PROCEDURE — 88175 CYTOPATH C/V AUTO FLUID REDO: CPT | Performed by: NURSE PRACTITIONER

## 2024-06-26 PROCEDURE — 3008F BODY MASS INDEX DOCD: CPT | Mod: CPTII,S$GLB,, | Performed by: NURSE PRACTITIONER

## 2024-06-26 PROCEDURE — 1159F MED LIST DOCD IN RCRD: CPT | Mod: CPTII,S$GLB,, | Performed by: NURSE PRACTITIONER

## 2024-06-26 PROCEDURE — 87624 HPV HI-RISK TYP POOLED RSLT: CPT | Performed by: NURSE PRACTITIONER

## 2024-06-26 PROCEDURE — 3074F SYST BP LT 130 MM HG: CPT | Mod: CPTII,S$GLB,, | Performed by: NURSE PRACTITIONER

## 2024-06-26 RX ORDER — DESOGESTREL AND ETHINYL ESTRADIOL 21-5 (28)
1 KIT ORAL DAILY
Qty: 90 TABLET | Refills: 3 | Status: SHIPPED | OUTPATIENT
Start: 2024-06-26

## 2024-06-26 NOTE — PROGRESS NOTES
CC: Annual  HPI: Pt is a 33 y.o.  female who presents for routine annual exam. She uses condoms for contraception. She does not want STD screening. Gets spotting for a few days prior to her menses now which is new. Open to restarting ocps to see if that helps. Family does have a history of uterine polyps. Still working for [employer] but does float now.    Notes from last visit with me in October 2021  CC: Annual  HPI: Pt is a 30 y.o.  female who presents for routine annual exam. She uses condoms for contraception. She does not want STD screening. No issues today. Periods are regular. 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   NOTES FROM LAST VISIT WITH ME IN 2020-  CC: Annual  HPI: Pt is a 29 y.o.  female who presents for routine annual exam. She uses condoms for contraception-SA with one parnter. She does want STD screening-swabs only. No issues or complaints today. Cycles are regular. Will get flu shot at work.      FH:   Breast cancer: none  Colon cancer: none  Ovarian cancer: none  Uterine cancer: none     HPV vaccine: yes  Last pap smear: 2020 NILM  History of abnormal pap smears: no  Colonoscopy: na  DEXA: na   Mammogram: na  STD history: no  Birth control: condoms  OB history: G0  Tobacco use: no      ROS:  GENERAL: Feeling well overall. Denies fever or chills.   SKIN: Denies rash or lesions.   HEAD: Denies head injury or headache.   NODES: Denies enlarged lymph nodes.   CHEST: Denies chest pain or shortness of breath.   CARDIOVASCULAR: Denies palpitations or left sided chest pain.   ABDOMEN: No abdominal pain, constipation, diarrhea, nausea, vomiting or rectal bleeding.   URINARY: No dysuria, hematuria, or burning on urination.  REPRODUCTIVE: See HPI.   BREASTS: Denies pain, lumps, or nipple discharge.   HEMATOLOGIC: No easy bruisability or excessive bleeding.   MUSCULOSKELETAL: Denies joint pain or swelling.   NEUROLOGIC: Denies syncope or weakness.   PSYCHIATRIC: Denies depression, anxiety or  mood swings.    PE:   APPEARANCE: Well nourished, well developed, Black or  female in no acute distress.  NODES: no cervical, supraclavicular, or inguinal lymphadenopathy  BREASTS: Symmetrical, no skin changes or visible lesions. No palpable masses, nipple discharge or adenopathy bilaterally.  ABDOMEN: Soft. No tenderness or masses. No distention. No hernias palpated. No CVA tenderness.  VULVA: No lesions. Normal external female genitalia.  URETHRAL MEATUS: Normal size and location, no lesions, no prolapse.  URETHRA: No masses, tenderness, or prolapse.  VAGINA: Moist. No lesions or lacerations noted. No abnormal discharge present. No odor present.   CERVIX: No lesions or discharge. No cervical motion tenderness.   UTERUS: Normal size, regular shape, mobile, non-tender.  ADNEXA: No tenderness. No fullness or masses palpated in the adnexal regions.   ANUS PERINEUM: Normal.      Diagnosis:  1. Well woman exam with routine gynecological exam    2. Pap smear for cervical cancer screening    3. Encounter for initial prescription of contraceptive pills        Plan:     Orders Placed This Encounter    HPV High Risk Genotypes, PCR    Liquid-Based Pap Smear, Screening    desog-e.estradioL/e.estradioL (VIORELE, 28,) 0.15-0.02 mgx21 /0.01 mg x 5 per tablet     Pap updated  Declines std screening  Restart ocps- used to do Viorele, briefly discussed [place] and/or pelvic US if spotting continues  Mammogram at age 42    Patient was counseled today on the new ACS guidelines for cervical cytology screening as well as the current recommendations for breast cancer screening. She was counseled to follow up with her PCP for other routine health maintenance. Counseling session lasted approximately 10 minutes, and all her questions were answered.  For women over the age of 65, you can stop having cervical cancer screenings if you have never had abnormal cervical cells or cervical cancer, and youve had three negative Pap  tests in a row. (You also can stop screening if youve had two negative Pap and HPV tests in a row in the past 10 years, with at least one test in the past 5 years.),    Follow-up with me in 1 year for routine exam; pap in 3 years.     I spent a total of 20 minutes on the day of the visit.This includes face to face time and non-face to face time preparing to see the patient (eg, review of tests), obtaining and/or reviewing separately obtained history, documenting clinical information in the electronic or other health record, independently interpreting results and communicating results to the patient/family/caregiver, or care coordinator.    As of April 1, 2021, the Cures Act has been passed nationally. This new law requires that all doctors progress notes, lab results, pathology reports and radiology reports be released IMMEDIATELY to the patient in the patient portal. That means that the results are released to you at the EXACT same time they are released to me. Therefore, with all of the patients that I have I am not able to reply to each patient exactly when the results come in. So there will be a delay from when you see the results to when I see them and have time to come up with a response to send you. Also I only see these results when I am on the computer at work. So if the results come in over the weekend or after 5 pm of a work day, I will not see them until the next business day. As you can tell, this is a challenge as a provider to give every patient the quick response they hope for and deserve. So please be patient!     Thanks for your understanding and patience.

## 2024-06-27 LAB
CLINICAL INFO: NORMAL
DATE OF PREVIOUS PAP: NORMAL
DATE PREVIOUS BX: NO
LMP START DATE: NORMAL
SPECIMEN SOURCE CVX/VAG CYTO: NORMAL

## 2024-08-02 ENCOUNTER — PATIENT MESSAGE (OUTPATIENT)
Dept: OBSTETRICS AND GYNECOLOGY | Facility: CLINIC | Age: 34
End: 2024-08-02
Payer: COMMERCIAL

## 2024-08-02 DIAGNOSIS — Z30.011 ENCOUNTER FOR INITIAL PRESCRIPTION OF CONTRACEPTIVE PILLS: Primary | ICD-10-CM

## 2024-08-02 RX ORDER — DROSPIRENONE AND ESTETROL 3-14.2(28)
1 KIT ORAL DAILY
Qty: 84 TABLET | Refills: 1 | Status: SHIPPED | OUTPATIENT
Start: 2024-08-02

## 2025-04-24 ENCOUNTER — PATIENT MESSAGE (OUTPATIENT)
Dept: OBSTETRICS AND GYNECOLOGY | Facility: CLINIC | Age: 35
End: 2025-04-24
Payer: COMMERCIAL

## 2025-04-24 DIAGNOSIS — Z30.011 ENCOUNTER FOR INITIAL PRESCRIPTION OF CONTRACEPTIVE PILLS: ICD-10-CM

## 2025-04-24 RX ORDER — DESOGESTREL AND ETHINYL ESTRADIOL AND ETHINYL ESTRADIOL 21-5 (28)
1 KIT ORAL
Qty: 84 TABLET | Refills: 0 | Status: SHIPPED | OUTPATIENT
Start: 2025-04-24